# Patient Record
Sex: FEMALE | Race: WHITE | NOT HISPANIC OR LATINO | Employment: OTHER | URBAN - METROPOLITAN AREA
[De-identification: names, ages, dates, MRNs, and addresses within clinical notes are randomized per-mention and may not be internally consistent; named-entity substitution may affect disease eponyms.]

---

## 2020-02-17 ENCOUNTER — TELEPHONE (OUTPATIENT)
Dept: ORTHOPEDICS | Facility: CLINIC | Age: 55
End: 2020-02-17

## 2020-02-17 ENCOUNTER — HOSPITAL ENCOUNTER (OUTPATIENT)
Dept: RADIOLOGY | Facility: HOSPITAL | Age: 55
Discharge: HOME | End: 2020-02-17
Attending: ORTHOPAEDIC SURGERY
Payer: COMMERCIAL

## 2020-02-17 ENCOUNTER — OFFICE VISIT (OUTPATIENT)
Dept: ORTHOPEDICS | Facility: CLINIC | Age: 55
End: 2020-02-17
Payer: COMMERCIAL

## 2020-02-17 VITALS
DIASTOLIC BLOOD PRESSURE: 73 MMHG | HEIGHT: 64 IN | WEIGHT: 190 LBS | HEART RATE: 65 BPM | SYSTOLIC BLOOD PRESSURE: 154 MMHG | BODY MASS INDEX: 32.44 KG/M2

## 2020-02-17 DIAGNOSIS — M25.511 RIGHT SHOULDER PAIN, UNSPECIFIED CHRONICITY: ICD-10-CM

## 2020-02-17 DIAGNOSIS — M75.21 BICEPS TENDONITIS, RIGHT: ICD-10-CM

## 2020-02-17 DIAGNOSIS — M25.511 RIGHT SHOULDER PAIN, UNSPECIFIED CHRONICITY: Primary | ICD-10-CM

## 2020-02-17 DIAGNOSIS — M75.31 CALCIFIC TENDINITIS OF RIGHT SHOULDER: Primary | ICD-10-CM

## 2020-02-17 DIAGNOSIS — M19.011 ARTHRITIS OF RIGHT ACROMIOCLAVICULAR JOINT: ICD-10-CM

## 2020-02-17 PROCEDURE — 73030 X-RAY EXAM OF SHOULDER: CPT | Mod: TC,FY,RT

## 2020-02-17 PROCEDURE — 20610 LARGE JOINT ASPIRATION/INJECTION: R GLENOHUMERAL: ICD-10-PCS | Mod: RT,S$GLB,, | Performed by: ORTHOPAEDIC SURGERY

## 2020-02-17 PROCEDURE — 73030 XR SHOULDER COMPLETE 2 OR MORE VIEWS RIGHT: ICD-10-PCS | Mod: 26,RT,, | Performed by: RADIOLOGY

## 2020-02-17 PROCEDURE — 99204 PR OFFICE/OUTPT VISIT, NEW, LEVL IV, 45-59 MIN: ICD-10-PCS | Mod: 25,S$GLB,, | Performed by: ORTHOPAEDIC SURGERY

## 2020-02-17 PROCEDURE — 99999 PR PBB SHADOW E&M-NEW PATIENT-LVL III: CPT | Mod: PBBFAC,,, | Performed by: ORTHOPAEDIC SURGERY

## 2020-02-17 PROCEDURE — 20610 DRAIN/INJ JOINT/BURSA W/O US: CPT | Mod: RT,S$GLB,, | Performed by: ORTHOPAEDIC SURGERY

## 2020-02-17 PROCEDURE — 99204 OFFICE O/P NEW MOD 45 MIN: CPT | Mod: 25,S$GLB,, | Performed by: ORTHOPAEDIC SURGERY

## 2020-02-17 PROCEDURE — 73030 X-RAY EXAM OF SHOULDER: CPT | Mod: 26,RT,, | Performed by: RADIOLOGY

## 2020-02-17 PROCEDURE — 99999 PR PBB SHADOW E&M-NEW PATIENT-LVL III: ICD-10-PCS | Mod: PBBFAC,,, | Performed by: ORTHOPAEDIC SURGERY

## 2020-02-17 RX ORDER — VALACYCLOVIR HYDROCHLORIDE 1 G/1
TABLET, FILM COATED ORAL
COMMUNITY
Start: 2019-08-10

## 2020-02-17 RX ORDER — LANOLIN ALCOHOL/MO/W.PET/CERES
1000 CREAM (GRAM) TOPICAL
COMMUNITY

## 2020-02-17 RX ORDER — LEVOTHYROXINE SODIUM 125 UG/1
TABLET ORAL
COMMUNITY
Start: 2019-07-31

## 2020-02-17 RX ORDER — CITALOPRAM 40 MG/1
TABLET, FILM COATED ORAL
COMMUNITY
Start: 2019-04-29

## 2020-02-17 RX ORDER — MELATONIN 10 MG
TABLET, SUBLINGUAL SUBLINGUAL
COMMUNITY

## 2020-02-17 RX ORDER — TRIAMCINOLONE ACETONIDE 40 MG/ML
60 INJECTION, SUSPENSION INTRA-ARTICULAR; INTRAMUSCULAR
Status: DISCONTINUED | OUTPATIENT
Start: 2020-02-17 | End: 2020-02-17 | Stop reason: HOSPADM

## 2020-02-17 RX ORDER — ONDANSETRON 4 MG/1
4 TABLET, ORALLY DISINTEGRATING ORAL EVERY 8 HOURS PRN
COMMUNITY
Start: 2019-05-17

## 2020-02-17 RX ADMIN — TRIAMCINOLONE ACETONIDE 60 MG: 40 INJECTION, SUSPENSION INTRA-ARTICULAR; INTRAMUSCULAR at 10:02

## 2020-02-17 NOTE — TELEPHONE ENCOUNTER
----- Message from Mleina Fabio sent at 2/17/2020  1:54 PM CST -----  Contact: self [phone]  She did not receive the after visit summary, she is requesting it be faxed to her at [phone].  Thank you!

## 2020-02-17 NOTE — PROCEDURES
Large Joint Aspiration/Injection: R glenohumeral  Performed by: Javed Alberts DO  Authorized by: Javed Alberts DO  Date/Time: 2/17/2020 10:00 AM    Consent Done?:  Yes (Verbal)  Indications:  diagnostic evaluation and pain  Timeout: Immediately prior to procedure a time out was called to verify the correct patient, procedure, equipment, support staff and site/side marked as required.  Prep:Patient was prepped and draped in the usual sterile fashion.  Procedure site marked: Yes     Details:   Needle size: 22 G    Ultrasonic Guidance for needle placement: No   Approach: posterior  Location:  Shoulder  Site:  R glenohumeral    Medications: 60 mg triamcinolone acetonide 40 mg/mL  Patient tolerance:  patient tolerated the procedure well with no immediate complications

## 2020-02-17 NOTE — TELEPHONE ENCOUNTER
Returned call to patient to inform her that Dr. Alberts does not complete his documentation until clinic is completed. Patient verbalized understanding and informed me that she will be signing up for the [facility] today for her office note when it is completed.

## 2020-02-17 NOTE — PROGRESS NOTES
Subjective:      Patient ID: Blanche Haney is a 55 y.o. female.    Chief Complaint: Pain of the Right Shoulder    Referring Provider: Tobias Self  No address on file    HPI:  Ms. Haney is a 55-year-old right-hand-dominant lady who presented today for evaluation of 4-5 months of right shoulder pain which began while she was caring for her mother she went to lift her mother and felt pain in her shoulder.  Forward flexion, abduction, and overhead activities increase her symptoms while holding her arm down decreases it. Her symptoms awaken her at night.  She has taken NSAIDs with help.  She has not done physical therapy nor had injections.    Past Medical History:   Diagnosis Date    Hypertension      *  *  *  *  *  *  * Thyroid disease  Diabetes  Seasonal allergies  Depression  Anxiety  GERD  Diverticulitis  Headaches      Past Surgical History:   Procedure Laterality Date    COLON SURGERY laparoscopy with colon resection       *  * HYSTERECTOMY  TUBAL LIGATION  COLONOSCOPY         Review of patient's allergies indicates:  No Known Allergies    Social History     Occupational History    Painting business owner   Tobacco Use    Smoking status: Never Smoker   Substance and Sexual Activity    Alcohol use: Never     Frequency: Never    Drug use: Never    Sexual activity: Not on file      Family History   Problem Relation Age of Onset    Dementia Mother     No Known Problems Father     Diabetes Maternal Grandmother     Diabetes Maternal Grandfather        Previous Hospitalizations:  Childbirth.    ROS:   Review of Systems   Constitution: Negative for chills and fever.   HENT: Negative for congestion.    Eyes: Negative for blurred vision.   Cardiovascular: Negative for chest pain.   Respiratory: Negative for cough.    Endocrine: Negative for polydipsia.   Hematologic/Lymphatic: Negative for adenopathy.   Skin: Negative for flushing, itching and skin cancer.   Musculoskeletal: Positive for joint pain.  Negative for gout.   Gastrointestinal: Positive for heartburn. Negative for constipation and diarrhea.   Genitourinary: Negative for nocturia.   Neurological: Positive for headaches. Negative for seizures.   Psychiatric/Behavioral: Positive for depression. Negative for substance abuse. The patient is nervous/anxious.    Allergic/Immunologic: Positive for environmental allergies.           Objective:      Physical Exam:   General: AAOx3.  No acute distress  HEENT: Normocephalic, PEARLA EOMI, Good Dentition  Neck: Supple, No JVD  Chest: Symetric, equal excursion on inspiration  Abdomen: Soft NTND  Vascular:  Pulses intact and equal bilaterally.  Capillary refill less than 3 seconds and equal bilaterally  Neurologic:  Pinprick and soft touch intact and equal bilaterally  Integment:  No ecchymosis, no errythema  Extremity:  Shoulder:  Forward flexion/abduction equal bilaterally 0/175 degrees. Internal rotation equal bilaterally T9.  External rotation equal bilaterally 0/20 degrees. Full can negative both shoulders.  Empty can negative both shoulders.  Bryant/Neer positive right shoulder.  Cross-arm negative both shoulders.  Nontender over the AC joint both shoulders.  Tender in the bicipital groove right shoulder.  Yergason's negative both shoulders.  Apprehension/relocation negative both shoulders.  Radiography:  Personally reviewed x-rays of the right shoulder completed on 02/17/2020 showed acromioclavicular arthritis and calcific tendinitis no fracture.      Assessment:       Impression:      1. Calcific tendinitis of right shoulder    2. Biceps tendonitis, right    3. Arthritis of right acromioclavicular joint          Plan:       1.  Discussed physical examination and radiographic findings with the patient. Blanche understands that she has calcific tendinitis of her right shoulder along with a biceps tendinitis and AC arthritis since she has not completed conservative management she may want to consider conservative  care before proceeding with surgery. She stated she would like to trial some conservative management.  2.  Offered a steroid injection to the right shoulder, she elected to proceed.  3.  Take NSAIDs as tolerated allowed by PCM.  4.  Home exercises to include rotator cuff exercises were shown discussed.   5.  Discussed possible referral to physical therapy declined for now.  6.  [facility] portal was discussed with the patient and information was given.  The patient was encouraged to use the portal for future encounters.  7.  Follow up p.r.n..

## 2020-09-23 DIAGNOSIS — M25.511 RIGHT SHOULDER PAIN, UNSPECIFIED CHRONICITY: Primary | ICD-10-CM

## 2020-09-24 ENCOUNTER — OFFICE VISIT (OUTPATIENT)
Dept: ORTHOPEDICS | Facility: CLINIC | Age: 55
End: 2020-09-24
Payer: COMMERCIAL

## 2020-09-24 ENCOUNTER — HOSPITAL ENCOUNTER (OUTPATIENT)
Dept: RADIOLOGY | Facility: HOSPITAL | Age: 55
Discharge: HOME | End: 2020-09-24
Attending: ORTHOPAEDIC SURGERY
Payer: COMMERCIAL

## 2020-09-24 VITALS
BODY MASS INDEX: 32.44 KG/M2 | HEART RATE: 70 BPM | OXYGEN SATURATION: 96 % | WEIGHT: 190 LBS | RESPIRATION RATE: 13 BRPM | TEMPERATURE: 98 F | HEIGHT: 64 IN

## 2020-09-24 DIAGNOSIS — M75.31 CALCIFIC TENDINITIS OF RIGHT SHOULDER REGION: Primary | ICD-10-CM

## 2020-09-24 DIAGNOSIS — M25.511 RIGHT SHOULDER PAIN, UNSPECIFIED CHRONICITY: ICD-10-CM

## 2020-09-24 DIAGNOSIS — M19.011 ARTHRITIS OF RIGHT ACROMIOCLAVICULAR JOINT: ICD-10-CM

## 2020-09-24 PROCEDURE — 73030 X-RAY EXAM OF SHOULDER: CPT | Mod: TC,PN,RT

## 2020-09-24 PROCEDURE — 73030 X-RAY EXAM OF SHOULDER: CPT | Mod: 26,RT,, | Performed by: RADIOLOGY

## 2020-09-24 PROCEDURE — 99213 OFFICE O/P EST LOW 20 MIN: CPT | Mod: 25,S$GLB,, | Performed by: ORTHOPAEDIC SURGERY

## 2020-09-24 PROCEDURE — 99213 PR OFFICE/OUTPT VISIT, EST, LEVL III, 20-29 MIN: ICD-10-PCS | Mod: 25,S$GLB,, | Performed by: ORTHOPAEDIC SURGERY

## 2020-09-24 PROCEDURE — 20610 DRAIN/INJ JOINT/BURSA W/O US: CPT | Mod: RT,S$GLB,, | Performed by: ORTHOPAEDIC SURGERY

## 2020-09-24 PROCEDURE — 99999 PR PBB SHADOW E&M-EST. PATIENT-LVL III: CPT | Mod: PBBFAC,,, | Performed by: ORTHOPAEDIC SURGERY

## 2020-09-24 PROCEDURE — 20610 LARGE JOINT ASPIRATION/INJECTION: R GLENOHUMERAL: ICD-10-PCS | Mod: RT,S$GLB,, | Performed by: ORTHOPAEDIC SURGERY

## 2020-09-24 PROCEDURE — 99999 PR PBB SHADOW E&M-EST. PATIENT-LVL III: ICD-10-PCS | Mod: PBBFAC,,, | Performed by: ORTHOPAEDIC SURGERY

## 2020-09-24 PROCEDURE — 73030 XR SHOULDER TRAUMA 3 VIEW RIGHT: ICD-10-PCS | Mod: 26,RT,, | Performed by: RADIOLOGY

## 2020-09-24 RX ORDER — TRIAMCINOLONE ACETONIDE 40 MG/ML
40 INJECTION, SUSPENSION INTRA-ARTICULAR; INTRAMUSCULAR
Status: DISCONTINUED | OUTPATIENT
Start: 2020-09-24 | End: 2020-09-24 | Stop reason: HOSPADM

## 2020-09-24 RX ADMIN — TRIAMCINOLONE ACETONIDE 40 MG: 40 INJECTION, SUSPENSION INTRA-ARTICULAR; INTRAMUSCULAR at 10:09

## 2020-09-24 NOTE — PROGRESS NOTES
Subjective:      Patient ID: Blanche Haney is a 55 y.o. female.    Chief Complaint: Pain of the Right Shoulder      HPI: Ms. Haney returns today with complaints of recurrent pain in her right shoulder.  At her last visit on 02/17/2020 she was given a steroid injection which gave her relief until approximately 2 months ago.  She stated that during the day her shoulder feels okay but at night she has increased pain which awakens her.  She has taken NSAIDs which are not helping.  She has been doing home exercises.    ROS:  New diagnosis/surgery/prescriptions since last office visit on 02/17/2020:  Right Achilles tendinitis being treated by her podiatrist  Constitution: Negative for chills and fever.   HENT: Negative for congestion.    Eyes: Negative for blurred vision.   Cardiovascular: Negative for chest pain.   Respiratory: Negative for cough.    Endocrine: Negative for polydipsia.   Hematologic/Lymphatic: Negative for adenopathy.   Skin: Negative for flushing, itching and skin cancer.   Musculoskeletal: Positive for joint pain. Negative for gout.   Gastrointestinal: Positive for heartburn. Negative for constipation and diarrhea.   Genitourinary: Negative for nocturia.   Neurological: Positive for headaches. Negative for seizures.   Psychiatric/Behavioral: Positive for depression. Negative for substance abuse. The patient is nervous/anxious.    Allergic/Immunologic: Positive for environmental allergies.       Objective:      Physical Exam:   General: AAOx3.  No acute distress  Vascular:  Pulses intact and equal bilaterally.  Capillary refill less than 3 seconds and equal bilaterally  Neurologic:  Pinprick and soft touch intact and equal bilaterally  Integment:  No ecchymosis, no errythema  Extremity: Shoulder:  Forward flexion/abduction equal bilaterally 0/175 degrees. Internal rotation equal bilaterally T9.  External rotation equal bilaterally 0/20 degrees. Full can negative both shoulders.  Empty can negative  both shoulders.  Bryant/Neer positive right shoulder.  Cross-arm negative both shoulders.  Nontender over the AC joint both shoulders.  Tender in the bicipital groove right shoulder.  Yergason's negative both shoulders.  Apprehension/relocation negative both shoulders.  Radiography:  Personally reviewed x-rays of the right shoulder completed on 09/24/2020 showed acromioclavicular arthritis and calcific tendinitis no fracture.      Assessment:       Impression:   1.  Calcific tendinitis, right shoulder.  2.  AC arthritis, right shoulder.      Plan:       1.  Discussed physical examination and radiographic findings with the patient. Blanche understands that she still has calcific tendinitis of her right shoulder long with AC arthritis.  Since she got a good result with a steroid injection in the past she could trial another steroid injection.  2.  Offered a steroid injection to the right shoulder, she elected to proceed.  3.  Continue with home exercises which were shown previously to include Codman exercises, cane exercises, wall walking exercises, towel exercises, and rotator cuff exercises.  4.  Take NSAIDs as tolerated allowed by PCM.  5.  Follow up p.r.n..

## 2020-09-24 NOTE — PROCEDURES
Large Joint Aspiration/Injection: R glenohumeral    Date/Time: 9/24/2020 10:45 AM  Performed by: Javed Alberts DO  Authorized by: Javed Alberts, DO     Consent Done?:  Yes (Verbal)  Indications:  Diagnostic evaluation and pain  Site marked: the procedure site was marked    Timeout: prior to procedure the correct patient, procedure, and site was verified    Prep: patient was prepped and draped in usual sterile fashion      Details:  Needle Size:  22 G  Ultrasonic Guidance for needle placement?: No    Approach:  Posterior  Location:  Shoulder  Site:  R glenohumeral  Medications:  40 mg triamcinolone acetonide 40 mg/mL  Patient tolerance:  Patient tolerated the procedure well with no immediate complications

## 2020-12-16 DIAGNOSIS — M25.572 ACUTE LEFT ANKLE PAIN: Primary | ICD-10-CM

## 2020-12-22 ENCOUNTER — HOSPITAL ENCOUNTER (OUTPATIENT)
Dept: RADIOLOGY | Facility: HOSPITAL | Age: 55
Discharge: HOME | End: 2020-12-22
Attending: ORTHOPAEDIC SURGERY
Payer: COMMERCIAL

## 2020-12-22 ENCOUNTER — OFFICE VISIT (OUTPATIENT)
Dept: ORTHOPEDICS | Facility: CLINIC | Age: 55
End: 2020-12-22
Payer: COMMERCIAL

## 2020-12-22 VITALS — HEIGHT: 64 IN | BODY MASS INDEX: 32.44 KG/M2 | WEIGHT: 190 LBS | RESPIRATION RATE: 17 BRPM

## 2020-12-22 DIAGNOSIS — M77.32 HEEL SPUR, LEFT: Primary | ICD-10-CM

## 2020-12-22 DIAGNOSIS — M25.572 ACUTE LEFT ANKLE PAIN: ICD-10-CM

## 2020-12-22 PROCEDURE — 73610 X-RAY EXAM OF ANKLE: CPT | Mod: TC,PN,LT

## 2020-12-22 PROCEDURE — 99999 PR PBB SHADOW E&M-EST. PATIENT-LVL III: CPT | Mod: PBBFAC,,, | Performed by: ORTHOPAEDIC SURGERY

## 2020-12-22 PROCEDURE — 99213 PR OFFICE/OUTPT VISIT, EST, LEVL III, 20-29 MIN: ICD-10-PCS | Mod: S$GLB,,, | Performed by: ORTHOPAEDIC SURGERY

## 2020-12-22 PROCEDURE — 99999 PR PBB SHADOW E&M-EST. PATIENT-LVL III: ICD-10-PCS | Mod: PBBFAC,,, | Performed by: ORTHOPAEDIC SURGERY

## 2020-12-22 PROCEDURE — 73610 XR ANKLE COMPLETE 3 VIEW LEFT: ICD-10-PCS | Mod: 26,LT,, | Performed by: RADIOLOGY

## 2020-12-22 PROCEDURE — 99213 OFFICE O/P EST LOW 20 MIN: CPT | Mod: S$GLB,,, | Performed by: ORTHOPAEDIC SURGERY

## 2020-12-22 PROCEDURE — 73610 X-RAY EXAM OF ANKLE: CPT | Mod: 26,LT,, | Performed by: RADIOLOGY

## 2020-12-22 NOTE — PROGRESS NOTES
Subjective:      Patient ID: Blanche Haney is a 55 y.o. female.    Chief Complaint: Pain of the Left Foot      HPI:  Ms. Haney returns today with new complaints of 2 years of left heel pain which originally began when she was standing up and felt pain in her heel.  She will get progressive pain.  At this point when she wakes up in the morning she has pain and after she gets moving the pain decreases.  Also sitting for extended periods increases her symptoms.  She has not taken NSAIDs nor used arch supports.  She has not had injections.  She did physical therapy before which did help.    ROS:  No new diagnosis/surgery/prescriptions since last office visit on 09/24/2020.  Constitution: Negative for chills and fever.   HENT: Negative for congestion.    Eyes: Negative for blurred vision.   Cardiovascular: Negative for chest pain.   Respiratory: Negative for cough.    Endocrine: Negative for polydipsia.   Hematologic/Lymphatic: Negative for adenopathy.   Skin: Negative for flushing, itching and skin cancer.   Musculoskeletal: Positive for joint pain. Negative for gout.   Gastrointestinal: Positive for heartburn. Negative for constipation and diarrhea.   Genitourinary: Negative for nocturia.   Neurological: Positive for headaches. Negative for seizures.   Psychiatric/Behavioral: Positive for depression. Negative for substance abuse. The patient is nervous/anxious.    Allergic/Immunologic: Positive for environmental allergies.      Objective:      Physical Exam:   General: AAOx3.  No acute distress  Vascular:  Pulses intact and equal bilaterally.  Capillary refill less than 3 seconds and equal bilaterally  Neurologic:  Pinprick and soft touch intact and equal bilaterally  Integment:  No ecchymosis, no errythema  Extremity:  Ankle/foot:  Dorsiflexion/plantar flexion equal bilaterally 22/32 degrees.  Inversion/eversion equal bilaterally.  Nontender at the ATFL bilaterally.  Nontender at the deltoid ligament bilaterally.   Nontender at the Lisfranc interval bilaterally.  Squeeze test negative bilaterally.  Windlass negative bilaterally.  Nontender at the calcaneal insertion of the plantar fascia.  Mild tenderness with palpation Achilles insertion.  Swelling at the Achilles insertion.  Achilles palpable throughout full distribution.  Radiography:  Personally reviewed x-rays of the left heel completed on 12/22/2020 showed a large superior calcaneal heel spur at the Achilles insertion.  No fracture dislocation.      Assessment:       Impression:  Pump bump left heel.      Plan:       1.  Discussed physical examination and radiographic findings with the patient. Blanche understands that she has an Achilles insertional calcaneal heel spur.  Treatment alternatives and outcomes were discussed with the patient she understands she could be treated conservatively with observation, activity modification, arch supports, physical therapy, injections, or she could consider surgical intervention such as bone spur debridement.  Recommend she continue with conservative measures.  She also understands not recommend she get steroid injections as they can be a nidus to cause a Achilles rupture.  2.  Offered referred to physical therapy she declined.  3.  Shoe wear was discussed with the patient.  4.  Take NSAIDs as tolerated allowed by PCM.  5.  Home exercise program to include heel stretching exercises were shown discussed.  6.  Voltaren 1% gel, apply to affected area twice daily and massage in for 2 min, dispense 100 g, refill 0.  7.  Follow up p.r.n..